# Patient Record
Sex: FEMALE | Race: WHITE | HISPANIC OR LATINO
[De-identification: names, ages, dates, MRNs, and addresses within clinical notes are randomized per-mention and may not be internally consistent; named-entity substitution may affect disease eponyms.]

---

## 2021-09-29 PROBLEM — Z00.00 ENCOUNTER FOR PREVENTIVE HEALTH EXAMINATION: Status: ACTIVE | Noted: 2021-09-29

## 2021-10-12 ENCOUNTER — APPOINTMENT (OUTPATIENT)
Dept: OBGYN | Facility: CLINIC | Age: 36
End: 2021-10-12

## 2021-10-27 ENCOUNTER — NON-APPOINTMENT (OUTPATIENT)
Age: 36
End: 2021-10-27

## 2021-10-27 ENCOUNTER — APPOINTMENT (OUTPATIENT)
Dept: OBGYN | Facility: CLINIC | Age: 36
End: 2021-10-27
Payer: COMMERCIAL

## 2021-10-27 VITALS
SYSTOLIC BLOOD PRESSURE: 100 MMHG | DIASTOLIC BLOOD PRESSURE: 80 MMHG | BODY MASS INDEX: 24.75 KG/M2 | HEIGHT: 64 IN | WEIGHT: 145 LBS

## 2021-10-27 DIAGNOSIS — Z01.419 ENCOUNTER FOR GYNECOLOGICAL EXAMINATION (GENERAL) (ROUTINE) W/OUT ABNORMAL FINDINGS: ICD-10-CM

## 2021-10-27 DIAGNOSIS — E23.6 OTHER DISORDERS OF PITUITARY GLAND: ICD-10-CM

## 2021-10-27 DIAGNOSIS — N91.2 AMENORRHEA, UNSPECIFIED: ICD-10-CM

## 2021-10-27 PROCEDURE — 81025 URINE PREGNANCY TEST: CPT

## 2021-10-27 PROCEDURE — 99385 PREV VISIT NEW AGE 18-39: CPT

## 2021-10-27 PROCEDURE — 36415 COLL VENOUS BLD VENIPUNCTURE: CPT

## 2021-10-27 NOTE — COUNSELING
[Nutrition/ Exercise/ Weight Management] : nutrition, exercise, weight management [Contraception/ Emergency Contraception/ Safe Sexual Practices] : contraception, emergency contraception, safe sexual practices [STD (testing, results, tx)] : STD (testing, results, tx) [Vaccines] : vaccines [Influenza Vaccine] : influenza vaccine [Lab Results] : lab results

## 2021-10-28 LAB — TSH SERPL-ACNC: 2.2 UIU/ML

## 2021-10-29 LAB — PROGEST SERPL-MCNC: 0.2 NG/ML

## 2021-11-01 ENCOUNTER — TRANSCRIPTION ENCOUNTER (OUTPATIENT)
Age: 36
End: 2021-11-01

## 2021-11-01 ENCOUNTER — NON-APPOINTMENT (OUTPATIENT)
Age: 36
End: 2021-11-01

## 2021-11-01 LAB
CYTOLOGY CVX/VAG DOC THIN PREP: NORMAL
ESTRADIOL SERPL-MCNC: 7 PG/ML
FSH SERPL-MCNC: 8.3 IU/L
LH SERPL-ACNC: 8.1 IU/L
PROLACTIN SERPL-MCNC: 3.6 NG/ML

## 2021-11-01 NOTE — PHYSICAL EXAM
[Appropriately responsive] : appropriately responsive [No Lymphadenopathy] : no lymphadenopathy [Soft] : soft [Non-tender] : non-tender [Non-distended] : non-distended [No HSM] : No HSM [No Lesions] : no lesions [No Mass] : no mass [Oriented x3] : oriented x3 [Examination Of The Breasts] : a normal appearance [] : implants [No Discharge] : no discharge [No Masses] : no breast masses were palpable [Vulvar Atrophy] : vulvar atrophy [Labia Majora] : normal [Labia Minora] : normal [No Bleeding] : There was no active vaginal bleeding [Normal] : normal [Uterine Adnexae] : non-palpable [No Tenderness] : no tenderness [FreeTextEntry8] : Nontender, no CMT, no adnexal masses or fullness appreciated.

## 2021-11-01 NOTE — HISTORY OF PRESENT ILLNESS
[Patient reported PAP Smear was normal] : Patient reported PAP Smear was normal [LMP unknown] : LMP unknown [Y] : Patient is sexually active [N] : Patient denies prior pregnancies [unknown] : Patient is unsure of the date of her LMP [Currently Active] : currently active [Men] : men [No] : No [FreeTextEntry1] : Patient presents for an initial visit for an annual. \par Patient hasn’t had her menstrual in years. \par No GYN concerns.  [TextBox_4] : Ms. ABIEL LAU  is a 36 year yo female who presents today for annual exam. She reports that she feels overall well and that she has no specific GYN complaints. 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 She is unsure of the size of the mass or if it secrets hormones. She was previously seen by a Neurologist and told to follow up but she has not been able to. She denies headaches and vision changes. She does note that she was previously told that her estrogen level is very low but she denies painful intercourse, vaginal dryness, mood swings, temperature sensitivity, and skin changes. 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 [PapSmeardate] : 01/17

## 2021-11-01 NOTE — PLAN
[FreeTextEntry1] : Ms. Fox presents for annual exam. Patient is clinically doing well but with persistent amenorrhea in the sitting of a known pituitary mass. \par - Vitals reviewed and within normal limits. \par - Breast exam, pelvic exam and pap smear performed today. \par - Given patient's history of pituitary mass and lack of follow up with a Neurologist, referral for [facility] provided. \par - Patient preventative health actions reviewed-  patient up to date with Flu and COVID19 vaccine. \par - TSH, Prolactin, FSH, LH, estradiol and progesterone sent. \par - Patient does not want to try OCPs to induce a period. She also does not want to become pregnant. \par  \par Return to the office pending results, as needed for GYN concerns and in 1 year for annual follow up. Plan of care discussed with patient who has no additional questions and is in agreement.\par